# Patient Record
Sex: FEMALE | Employment: FULL TIME | URBAN - METROPOLITAN AREA
[De-identification: names, ages, dates, MRNs, and addresses within clinical notes are randomized per-mention and may not be internally consistent; named-entity substitution may affect disease eponyms.]

---

## 2022-10-03 ENCOUNTER — OFFICE VISIT (OUTPATIENT)
Dept: FAMILY MEDICINE CLINIC | Facility: CLINIC | Age: 32
End: 2022-10-03
Payer: COMMERCIAL

## 2022-10-03 VITALS
HEART RATE: 85 BPM | WEIGHT: 181.2 LBS | DIASTOLIC BLOOD PRESSURE: 60 MMHG | SYSTOLIC BLOOD PRESSURE: 102 MMHG | OXYGEN SATURATION: 91 % | TEMPERATURE: 98 F

## 2022-10-03 DIAGNOSIS — Z76.89 ENCOUNTER TO ESTABLISH CARE: ICD-10-CM

## 2022-10-03 DIAGNOSIS — Z13.0 SCREENING, ANEMIA, DEFICIENCY, IRON: ICD-10-CM

## 2022-10-03 DIAGNOSIS — N89.8 VAGINAL DISCHARGE: Primary | ICD-10-CM

## 2022-10-03 DIAGNOSIS — Z13.220 SCREENING, LIPID: ICD-10-CM

## 2022-10-03 DIAGNOSIS — Z13.1 SCREENING FOR DIABETES MELLITUS: ICD-10-CM

## 2022-10-03 DIAGNOSIS — Z13.29 SCREENING FOR THYROID DISORDER: ICD-10-CM

## 2022-10-03 DIAGNOSIS — Z11.3 SCREEN FOR STD (SEXUALLY TRANSMITTED DISEASE): ICD-10-CM

## 2022-10-03 PROCEDURE — 87510 GARDNER VAG DNA DIR PROBE: CPT | Performed by: NURSE PRACTITIONER

## 2022-10-03 PROCEDURE — 87660 TRICHOMONAS VAGIN DIR PROBE: CPT | Performed by: NURSE PRACTITIONER

## 2022-10-03 PROCEDURE — 99203 OFFICE O/P NEW LOW 30 MIN: CPT | Performed by: NURSE PRACTITIONER

## 2022-10-03 PROCEDURE — 87480 CANDIDA DNA DIR PROBE: CPT | Performed by: NURSE PRACTITIONER

## 2022-10-03 RX ORDER — VIT C/B6/B5/MAGNESIUM/HERB 173 50-5-6-5MG
CAPSULE ORAL
COMMUNITY

## 2022-10-03 RX ORDER — OMEGA-3-ACID ETHYL ESTERS 1 G/1
2 CAPSULE, LIQUID FILLED ORAL 2 TIMES DAILY
COMMUNITY

## 2022-10-03 RX ORDER — GLUCOSAMINE/D3/BOSWELLIA SERRA 1500MG-400
TABLET ORAL
COMMUNITY

## 2022-10-03 RX ORDER — METRONIDAZOLE 500 MG/1
500 TABLET ORAL EVERY 12 HOURS SCHEDULED
Qty: 14 TABLET | Refills: 0 | Status: SHIPPED | OUTPATIENT
Start: 2022-10-03 | End: 2022-10-10

## 2022-10-03 RX ORDER — FLUCONAZOLE 150 MG/1
150 TABLET ORAL ONCE
Qty: 2 TABLET | Refills: 0 | Status: SHIPPED | OUTPATIENT
Start: 2022-10-03 | End: 2022-10-03

## 2022-10-03 NOTE — ASSESSMENT & PLAN NOTE
Will treat with course Flagyl and Diflucan today based on clinical findings  Dose, use and potential side effects discussed  Strict alcohol avoidance  Swab completed and will send for culture  Discussed recent infections and possible causes of same  Advise non fragrant cleansers - designed for vaginal use  Wear breathable, cotton underwear  Avoid wearing at night when possible  Years since any labs or preventative screenings completed  Fasting lab work orders today  Complete next week - after abx course competed  F/U in office for physical exam and testing results review   F/U sooner with problems or further concerns

## 2022-10-03 NOTE — PROGRESS NOTES
[facility]    ASSESSMENT AND PLAN     1  Vaginal discharge  Assessment & Plan: Will treat with course Flagyl and Diflucan today based on clinical findings  Dose, use and potential side effects discussed  Strict alcohol avoidance  Swab completed and will send for culture  Discussed recent infections and possible causes of same  Advise non fragrant cleansers - designed for vaginal use  Wear breathable, cotton underwear  Avoid wearing at night when possible  Years since any labs or preventative screenings completed  Fasting lab work orders today  Complete next week - after abx course competed  F/U in office for physical exam and testing results review  F/U sooner with problems or further concerns    Orders:  -     VAGINOSIS DNA PROBE (AFFIRM)  -     Chlamydia/GC amplified DNA by PCR; Future  -     metroNIDAZOLE (FLAGYL) 500 mg tablet; Take 1 tablet (500 mg total) by mouth every 12 (twelve) hours for 7 days  -     fluconazole (DIFLUCAN) 150 mg tablet; Take 1 tablet (150 mg total) by mouth once for 1 dose Take second tab 3 days later    2  Screening, anemia, deficiency, iron  -     CBC and differential; Future    3  Screening for diabetes mellitus  -     Comprehensive metabolic panel; Future    4  Screening for thyroid disorder  -     TSH, 3rd generation with Free T4 reflex; Future    5  Screening, lipid  -     Lipid panel; Future    6  Screen for STD (sexually transmitted disease)  -     HIV 1/2 Antigen/Antibody (4th Generation) w Reflex [facility]; Future  -     Hepatitis C antibody; Future  -     RPR; Future  -     Herpes I/II IgG NYDIA w Reflex to HSV-2; Future  -     Chlamydia/GC amplified DNA by PCR; Future  -     Hepatitis panel, acute; Future    7  Encounter to establish care  Comments:  Establishing primary care in our office         SUBJECTIVE       Patient ID: Tanvi King is a 27 y o  female  No chief complaint on file  HISTORY OF PRESENT ILLNESS    Establishing primary care in our office  Presents with  complaint  Symptoms on/off for several months, but more intense last week  Was seen at an urgent care and diagnosed with Herpes and Mycoplasm  States she obtained her own swab in the restroom - no speculum exam was completed  Denies any lesions or ever being tested for or diagnosed with Herpes  in the past  She was told to establish with a PCP for treatment  Was last treated for BV in May  One other infection within the last year  She is unsure why she has ad these recurrent infections  She preforms proper hygiene  Patient is not currently sexually active - last active one year ago  Does admit to using fragrant body wash in the past - but has recently changed to sensitive skin wash  No other risks factors or changes in personal care or health that she is aware of  Symptoms today: Notes thick discharge, itchy and odor  She is over due for preventative care  Several years  Was diagnosed with Barron Mejia a few years ago  Started on Levothyroxine, but self discontineud  Aside from situational stress, she feels well today other than her gyn complaints above  The following portions of the patient's history were reviewed and updated as appropriate: allergies, current medications, past family history, past medical history, past social history, past surgical history, and problem list     REVIEW OF SYSTEMS  Review of Systems   Constitutional: Negative  Respiratory: Negative  Cardiovascular: Negative  Genitourinary: Positive for vaginal discharge  Negative for pelvic pain, vaginal bleeding and vaginal pain  Psychiatric/Behavioral: Negative  Nervous/anxious: situational stress - recently moved to area - living with friend - recent job change - looking for own apartment          OBJECTIVE      VITAL SIGNS  /60 (BP Location: Left arm, Patient Position: Sitting, Cuff Size: Standard)   Pulse 85   Temp 98 °F (36 7 °C) (Temporal)   Wt 82 2 kg (181 lb 3 2 oz)   SpO2 91%     CURRENT MEDICATIONS    Current Outpatient Medications:     Biotin 60719 MCG TABS, Take by mouth, Disp: , Rfl:     cholecalciferol (VITAMIN D3) 400 units tablet, Take 400 Units by mouth daily, Disp: , Rfl:     fluconazole (DIFLUCAN) 150 mg tablet, Take 1 tablet (150 mg total) by mouth once for 1 dose Take second tab 3 days later, Disp: 2 tablet, Rfl: 0    metroNIDAZOLE (FLAGYL) 500 mg tablet, Take 1 tablet (500 mg total) by mouth every 12 (twelve) hours for 7 days, Disp: 14 tablet, Rfl: 0    omega-3-acid ethyl esters (LOVAZA) 1 g capsule, Take 2 g by mouth 2 (two) times a day, Disp: , Rfl:     Prenatal MV-Min-Fe Fum-FA-DHA (PRENATAL 1 PO), Take by mouth, Disp: , Rfl:     Probiotic Product (CULTURELLE PROBIOTICS PO), Take by mouth, Disp: , Rfl:     Turmeric 500 MG CAPS, Take by mouth, Disp: , Rfl:       PHYSICAL EXAMINATION   Physical Exam  Vitals and nursing note reviewed  Constitutional:       General: She is not in acute distress  Appearance: Normal appearance  She is not ill-appearing  HENT:      Head: Normocephalic  Pulmonary:      Effort: Pulmonary effort is normal  No respiratory distress  Genitourinary:     Exam position: Lithotomy position  Pubic Area: No rash  Labia:         Right: No tenderness or lesion  Left: No tenderness or lesion  Vagina: No signs of injury  Vaginal discharge and erythema present  No tenderness or bleeding  Comments: clitoral piercing  Skin:     General: Skin is warm and dry  Neurological:      General: No focal deficit present  Mental Status: She is alert and oriented to person, place, and time     Psychiatric:         Attention and Perception: Attention normal          Mood and Affect: Mood normal          Behavior: Behavior normal

## 2022-10-10 ENCOUNTER — TELEPHONE (OUTPATIENT)
Dept: FAMILY MEDICINE CLINIC | Facility: CLINIC | Age: 32
End: 2022-10-10

## 2022-10-10 NOTE — TELEPHONE ENCOUNTER
Romeo Kessler from [address] [place] [address] said that [facility] [employer] called stating there was an issue with the vaginosis swab done 10/3 Affirm

## 2022-10-26 ENCOUNTER — APPOINTMENT (OUTPATIENT)
Dept: LAB | Facility: CLINIC | Age: 32
End: 2022-10-26

## 2022-10-26 ENCOUNTER — OFFICE VISIT (OUTPATIENT)
Dept: FAMILY MEDICINE CLINIC | Facility: CLINIC | Age: 32
End: 2022-10-26

## 2022-10-26 VITALS
SYSTOLIC BLOOD PRESSURE: 110 MMHG | OXYGEN SATURATION: 100 % | BODY MASS INDEX: 31.89 KG/M2 | DIASTOLIC BLOOD PRESSURE: 70 MMHG | HEIGHT: 63 IN | TEMPERATURE: 98.5 F | HEART RATE: 64 BPM | WEIGHT: 180 LBS

## 2022-10-26 DIAGNOSIS — N76.0 BACTERIAL VAGINOSIS: ICD-10-CM

## 2022-10-26 DIAGNOSIS — Z11.3 SCREEN FOR STD (SEXUALLY TRANSMITTED DISEASE): ICD-10-CM

## 2022-10-26 DIAGNOSIS — Z13.1 SCREENING FOR DIABETES MELLITUS: ICD-10-CM

## 2022-10-26 DIAGNOSIS — Z76.89 ENCOUNTER TO ESTABLISH CARE: ICD-10-CM

## 2022-10-26 DIAGNOSIS — Z13.220 SCREENING, LIPID: ICD-10-CM

## 2022-10-26 DIAGNOSIS — Z12.4 SCREENING FOR CERVICAL CANCER: ICD-10-CM

## 2022-10-26 DIAGNOSIS — B96.89 BACTERIAL VAGINOSIS: ICD-10-CM

## 2022-10-26 DIAGNOSIS — Z13.0 SCREENING, ANEMIA, DEFICIENCY, IRON: ICD-10-CM

## 2022-10-26 DIAGNOSIS — N89.8 VAGINAL DISCHARGE: Primary | ICD-10-CM

## 2022-10-26 DIAGNOSIS — N89.8 VAGINAL ODOR: ICD-10-CM

## 2022-10-26 DIAGNOSIS — N89.8 VAGINAL DISCHARGE: ICD-10-CM

## 2022-10-26 DIAGNOSIS — Z13.29 SCREENING FOR THYROID DISORDER: ICD-10-CM

## 2022-10-26 LAB
ALBUMIN SERPL BCP-MCNC: 3.4 G/DL (ref 3.5–5)
ALP SERPL-CCNC: 55 U/L (ref 46–116)
ALT SERPL W P-5'-P-CCNC: 40 U/L (ref 12–78)
ANION GAP SERPL CALCULATED.3IONS-SCNC: 4 MMOL/L (ref 4–13)
AST SERPL W P-5'-P-CCNC: 29 U/L (ref 5–45)
BASOPHILS # BLD AUTO: 0.03 THOUSANDS/ÂΜL (ref 0–0.1)
BASOPHILS NFR BLD AUTO: 1 % (ref 0–1)
BILIRUB SERPL-MCNC: 0.27 MG/DL (ref 0.2–1)
BUN SERPL-MCNC: 5 MG/DL (ref 5–25)
CALCIUM ALBUM COR SERPL-MCNC: 9.8 MG/DL (ref 8.3–10.1)
CALCIUM SERPL-MCNC: 9.3 MG/DL (ref 8.3–10.1)
CHLORIDE SERPL-SCNC: 110 MMOL/L (ref 96–108)
CHOLEST SERPL-MCNC: 147 MG/DL
CO2 SERPL-SCNC: 26 MMOL/L (ref 21–32)
CREAT SERPL-MCNC: 0.76 MG/DL (ref 0.6–1.3)
EOSINOPHIL # BLD AUTO: 0.13 THOUSAND/ÂΜL (ref 0–0.61)
EOSINOPHIL NFR BLD AUTO: 3 % (ref 0–6)
ERYTHROCYTE [DISTWIDTH] IN BLOOD BY AUTOMATED COUNT: 12 % (ref 11.6–15.1)
GFR SERPL CREATININE-BSD FRML MDRD: 102 ML/MIN/1.73SQ M
GLUCOSE P FAST SERPL-MCNC: 88 MG/DL (ref 65–99)
HAV IGM SER QL: NORMAL
HBV CORE IGM SER QL: NORMAL
HBV SURFACE AG SER QL: NORMAL
HCT VFR BLD AUTO: 43.2 % (ref 34.8–46.1)
HCV AB SER QL: NORMAL
HDLC SERPL-MCNC: 50 MG/DL
HGB BLD-MCNC: 14.4 G/DL (ref 11.5–15.4)
IMM GRANULOCYTES # BLD AUTO: 0 THOUSAND/UL (ref 0–0.2)
IMM GRANULOCYTES NFR BLD AUTO: 0 % (ref 0–2)
LDLC SERPL CALC-MCNC: 81 MG/DL (ref 0–100)
LYMPHOCYTES # BLD AUTO: 2.1 THOUSANDS/ÂΜL (ref 0.6–4.47)
LYMPHOCYTES NFR BLD AUTO: 53 % (ref 14–44)
MCH RBC QN AUTO: 31 PG (ref 26.8–34.3)
MCHC RBC AUTO-ENTMCNC: 33.3 G/DL (ref 31.4–37.4)
MCV RBC AUTO: 93 FL (ref 82–98)
MONOCYTES # BLD AUTO: 0.38 THOUSAND/ÂΜL (ref 0.17–1.22)
MONOCYTES NFR BLD AUTO: 10 % (ref 4–12)
NEUTROPHILS # BLD AUTO: 1.31 THOUSANDS/ÂΜL (ref 1.85–7.62)
NEUTS SEG NFR BLD AUTO: 33 % (ref 43–75)
NONHDLC SERPL-MCNC: 97 MG/DL
NRBC BLD AUTO-RTO: 0 /100 WBCS
PLATELET # BLD AUTO: 324 THOUSANDS/UL (ref 149–390)
PMV BLD AUTO: 9.3 FL (ref 8.9–12.7)
POTASSIUM SERPL-SCNC: 3.9 MMOL/L (ref 3.5–5.3)
PROT SERPL-MCNC: 7.7 G/DL (ref 6.4–8.4)
RBC # BLD AUTO: 4.65 MILLION/UL (ref 3.81–5.12)
SODIUM SERPL-SCNC: 140 MMOL/L (ref 135–147)
TRIGL SERPL-MCNC: 80 MG/DL
TSH SERPL DL<=0.05 MIU/L-ACNC: 1.58 UIU/ML (ref 0.45–4.5)
WBC # BLD AUTO: 3.95 THOUSAND/UL (ref 4.31–10.16)

## 2022-10-26 RX ORDER — ZINC GLUCONATE 50 MG
50 TABLET ORAL DAILY
COMMUNITY

## 2022-10-26 NOTE — PROGRESS NOTES
[facility]    ASSESSMENT AND PLAN     1  Vaginal discharge  Assessment & Plan:  Mild vaginal discharge noted again during today's exam   No foul odor detected  Will collect Afirma swab as well as a Pap today (patient is very overdue)  Pt with recurrent symptoms over the last few months - treated several times at walk in clinics and then here  Would recommend establishing with gyn at this point for further evaluation and treatment recommendations  May likely need long-term treatment  Will call once swabs in fasting lab work are resulted  Addendum: Spoke with pt regarding results today (11/3)  Vaginal symptoms resolved several days ago but have restarted again yesterday  Advised she be evaluated by Gyn  Pt agreeable and referral placed  We did also discuss her STD testing results  She would like to recheck again in 6 months  Orders:  -     VAGINOSIS DNA PROBE (AFFIRM)  -     Ambulatory Referral to Gynecology; Future    2  Vaginal odor  -     VAGINOSIS DNA PROBE (AFFIRM)  -     Ambulatory Referral to Gynecology; Future    3  Screening for cervical cancer  -     Liquid-based pap, screening  -     HPV High Risk    4  Encounter to establish care  -     Ambulatory Referral to Gynecology; Future    5  Bacterial vaginosis  -     Ambulatory Referral to Gynecology; Future         SUBJECTIVE       Patient ID: Marianela Barrientos is a 27 y o  female  Chief Complaint   Patient presents with   • Vaginal Discharge     As of 2 days ago - odor is back- discharge is not as bad       HISTORY OF PRESENT ILLNESS    Presents complaint of recurrent vaginal discharge and genital order  On and off for the last several months  Cautious with her feminine hygiene products  Uses natural, fragrance- free soaps/cleansers  Cotton panties or none while sleeping  Does not douche  Has not been sexually active  Discharge is thick and yellow    Mild external itching    Female  Problem  The patient's primary symptoms include a genital odor and vaginal discharge  The patient's pertinent negatives include no genital itching, genital lesions, genital rash, missed menses, pelvic pain or vaginal bleeding  This is a recurrent problem  The current episode started yesterday  The problem occurs intermittently  The patient is experiencing no pain  She is not pregnant  Pertinent negatives include no abdominal pain, anorexia, back pain, chills, constipation, diarrhea, discolored urine, dysuria, fever, flank pain, frequency, headaches, hematuria, joint pain, joint swelling, nausea, painful intercourse, rash, sore throat, urgency or vomiting  The vaginal discharge was grey, malodorous, milky, thin, watery and white  She has not been passing clots  She has not been passing tissue  She is not sexually active  It is unknown whether or not her partner has an STD  She uses nothing for contraception  Her menstrual history has been regular  The following portions of the patient's history were reviewed and updated as appropriate: allergies, current medications, past family history, past medical history, past social history, past surgical history, and problem list     REVIEW OF SYSTEMS  Review of Systems   Constitutional: Negative for chills and fever  HENT: Negative for sore throat  Gastrointestinal: Negative for abdominal pain, anorexia, constipation, diarrhea, nausea and vomiting  Genitourinary: Positive for vaginal discharge  Negative for dysuria, flank pain, frequency, hematuria, missed menses, pelvic pain, urgency, vaginal bleeding and vaginal pain  Musculoskeletal: Negative for back pain and joint pain  Skin: Negative for rash  Neurological: Negative for headaches         OBJECTIVE      VITAL SIGNS  /70 (BP Location: Right arm, Patient Position: Sitting, Cuff Size: Standard)   Pulse 64   Temp 98 5 °F (36 9 °C) (Temporal)   Ht 5' 3" (1 6 m)   Wt 81 6 kg (180 lb)   SpO2 100%   BMI 31 89 kg/m²     CURRENT MEDICATIONS    Current Outpatient Medications:   •  Biotin 51116 MCG TABS, Take by mouth, Disp: , Rfl:   •  Cholecalciferol (VITAMIN D3 PO), Take 1,000 Units by mouth daily, Disp: , Rfl:   •  Cyanocobalamin (B-12 PO), Take 5,000 mcg by mouth, Disp: , Rfl:   •  Omega-3 Fatty Acids (OMEGA-3 FISH OIL PO), Take 2,000 mg by mouth in the morning, Disp: , Rfl:   •  Prenatal MV-Min-Fe Fum-FA-DHA (PRENATAL 1 PO), Take by mouth, Disp: , Rfl:   •  Probiotic Product (CULTURELLE PROBIOTICS PO), Take by mouth, Disp: , Rfl:   •  Turmeric 500 MG CAPS, Take by mouth, Disp: , Rfl:   •  zinc gluconate 50 mg tablet, Take 50 mg by mouth daily, Disp: , Rfl:   •  omega-3-acid ethyl esters (LOVAZA) 1 g capsule, Take 2 g by mouth 2 (two) times a day (Patient not taking: Reported on 10/26/2022), Disp: , Rfl:       PHYSICAL EXAMINATION   Physical Exam  Vitals and nursing note reviewed  Constitutional:       General: She is not in acute distress  Appearance: Normal appearance  She is not ill-appearing  HENT:      Head: Normocephalic  Pulmonary:      Effort: Pulmonary effort is normal  No respiratory distress  Genitourinary:     Exam position: Lithotomy position  Pubic Area: No rash  Labia:         Right: No rash, tenderness or lesion  Left: No rash, tenderness or lesion  Vagina: Vaginal discharge present  No erythema, tenderness or lesions  Cervix: No cervical motion tenderness  Neurological:      General: No focal deficit present  Mental Status: She is alert and oriented to person, place, and time     Psychiatric:         Attention and Perception: Attention normal          Mood and Affect: Mood normal          Behavior: Behavior normal

## 2022-10-27 LAB
C TRACH DNA SPEC QL NAA+PROBE: NEGATIVE
HIV 1+2 AB+HIV1 P24 AG SERPL QL IA: NORMAL
HPV HR 12 DNA CVX QL NAA+PROBE: NEGATIVE
HPV16 DNA CVX QL NAA+PROBE: NEGATIVE
HPV18 DNA CVX QL NAA+PROBE: NEGATIVE
HSV1 IGG SER IA-ACNC: 23 INDEX (ref 0–0.9)
HSV2 IGG SER IA-ACNC: 11.7 INDEX (ref 0–0.9)
N GONORRHOEA DNA SPEC QL NAA+PROBE: NEGATIVE
RPR SER QL: NORMAL

## 2022-10-28 ENCOUNTER — TELEPHONE (OUTPATIENT)
Dept: FAMILY MEDICINE CLINIC | Facility: CLINIC | Age: 32
End: 2022-10-28

## 2022-10-28 LAB
CANDIDA RRNA VAG QL PROBE: NEGATIVE
G VAGINALIS RRNA GENITAL QL PROBE: POSITIVE
T VAGINALIS RRNA GENITAL QL PROBE: NEGATIVE

## 2022-11-02 ENCOUNTER — TELEPHONE (OUTPATIENT)
Dept: FAMILY MEDICINE CLINIC | Facility: CLINIC | Age: 32
End: 2022-11-02

## 2022-11-03 LAB
LAB AP GYN PRIMARY INTERPRETATION: NORMAL
Lab: NORMAL
PATH INTERP SPEC-IMP: NORMAL

## 2022-11-03 NOTE — TELEPHONE ENCOUNTER
Called pt and told her that ROSALINE Kulkarni wanted her to make an appt with OB-Gyn asap, as per [facility]

## 2022-11-03 NOTE — ASSESSMENT & PLAN NOTE
Mild vaginal discharge noted again during today's exam   No foul odor detected  Will collect Afirma swab as well as a Pap today (patient is very overdue)  Pt with recurrent symptoms over the last few months - treated several times at walk in clinics and then here  Would recommend establishing with gyn at this point for further evaluation and treatment recommendations  May likely need long-term treatment  Will call once swabs in fasting lab work are resulted  Addendum: Spoke with pt regarding results today (11/3)  Vaginal symptoms resolved several days ago but have restarted again yesterday  Advised she be evaluated by Gyn  Pt agreeable and referral placed  We did also discuss her STD testing results  She would like to recheck again in 6 months

## 2022-11-03 NOTE — TELEPHONE ENCOUNTER
Pt called and stated she would like to go over the labs that did come back      Please advise [phone]

## 2023-01-13 ENCOUNTER — OFFICE VISIT (OUTPATIENT)
Dept: OBGYN CLINIC | Facility: CLINIC | Age: 33
End: 2023-01-13

## 2023-01-13 VITALS
WEIGHT: 185 LBS | DIASTOLIC BLOOD PRESSURE: 82 MMHG | SYSTOLIC BLOOD PRESSURE: 140 MMHG | BODY MASS INDEX: 32.77 KG/M2 | HEART RATE: 76 BPM

## 2023-01-13 DIAGNOSIS — N92.6 IRREGULAR MENSES: ICD-10-CM

## 2023-01-13 DIAGNOSIS — N89.8 VAGINAL DISCHARGE: Primary | ICD-10-CM

## 2023-01-13 DIAGNOSIS — N76.0 RECURRENT VAGINITIS: ICD-10-CM

## 2023-01-13 DIAGNOSIS — B96.89 BV (BACTERIAL VAGINOSIS): ICD-10-CM

## 2023-01-13 DIAGNOSIS — N89.8 VAGINAL ODOR: ICD-10-CM

## 2023-01-13 DIAGNOSIS — N76.0 BV (BACTERIAL VAGINOSIS): ICD-10-CM

## 2023-01-13 LAB
BV WHIFF TEST VAG QL: ABNORMAL
CLUE CELLS SPEC QL WET PREP: ABNORMAL
PH SMN: 5 [PH]
SL AMB POCT URINE HCG: NORMAL
SL AMB POCT WET MOUNT: ABNORMAL
T VAGINALIS VAG QL WET PREP: ABNORMAL
YEAST VAG QL WET PREP: ABNORMAL

## 2023-01-13 RX ORDER — FLUCONAZOLE 150 MG/1
150 TABLET ORAL ONCE
Qty: 1 TABLET | Refills: 0 | Status: SHIPPED | OUTPATIENT
Start: 2023-01-13 | End: 2023-01-13

## 2023-01-13 RX ORDER — METRONIDAZOLE 7.5 MG/G
1 GEL VAGINAL
Qty: 70 G | Refills: 3 | Status: SHIPPED | OUTPATIENT
Start: 2023-01-13

## 2023-01-13 RX ORDER — METRONIDAZOLE 500 MG/1
500 TABLET ORAL EVERY 12 HOURS SCHEDULED
Qty: 14 TABLET | Refills: 0 | Status: SHIPPED | OUTPATIENT
Start: 2023-01-13 | End: 2023-01-20

## 2023-01-13 NOTE — PROGRESS NOTES
PROBLEM GYNECOLOGICAL VISIT    Angela Duke is a 27 y o  female who presents today with complaint of vaginal discharge and odor  Her general medical history has been reviewed and she reports it as follows:    Past Medical History:   Diagnosis Date   • Chlamydia    • Depression    • Gonorrhea    • Hashimoto's disease 2018    has not been treated since early , TSH normal 10/2022     Past Surgical History:   Procedure Laterality Date   • NO PAST SURGERIES       OB History        2    Para   1    Term   1       0    AB   1    Living   1       SAB   0    IAB   1    Ectopic   0    Multiple   0    Live Births   1               Social History     Tobacco Use   • Smoking status: Never   • Smokeless tobacco: Never   Vaping Use   • Vaping Use: Never used   Substance Use Topics   • Alcohol use: Yes     Comment: 1-2 drinks daily, more on weekends   • Drug use: Never     Social History     Substance and Sexual Activity   Sexual Activity Yes   • Partners: Male   • Birth control/protection: Emergency Contraception       Current Outpatient Medications   Medication Instructions   • Biotin 16239 MCG TABS Oral   • Cyanocobalamin (B-12 PO) 5,000 mcg, Oral   • Omega-3 Fatty Acids (OMEGA-3 FISH OIL PO) 2,000 mg, Oral, Daily   • Prenatal MV-Min-Fe Fum-FA-DHA (PRENATAL 1 PO) Oral   • Probiotic Product (CULTURELLE PROBIOTICS PO) Oral   • Turmeric 500 MG CAPS Oral   • zinc gluconate 50 mg, Oral, Daily       History of Present Illness:   Reports vaginal discharge and odor for the past 2 weeks  She has a history of requiring treatment for BV multiple times over the past year  She was last treated with Flagyl in 2022  Denies vaginal itching/irritation  Denies pelvic pain  She had TAB in 2022 and has not had menses since then, although has used Plan B x2 episodes  Review of Systems:  Review of Systems   Constitutional: Negative  Gastrointestinal: Negative      Genitourinary: Positive for vaginal discharge  Physical Exam:  /82   Pulse 76   Wt 83 9 kg (185 lb)   LMP 10/20/2022   BMI 32 77 kg/m²   Physical Exam  Constitutional:       General: She is not in acute distress  Genitourinary:      Vulva exam comments: Normal       Vaginal discharge present  No vaginal erythema  Neurological:      Mental Status: She is alert  Skin:     General: Skin is warm and dry  Vitals reviewed  Point of Care Testing:   -Wet mount: + clue cells, no trichomonads, few WBC's, pH=5 0   -KOH mount: no hyphae   -Whiff: positive   -urine pregnancy test: negative    Assessment:   1  Recurrent BV  Plan:   1  Cultures ordered: GC/CT  2  Given Rx PO Flagyl and then for Metrogel twice weekly x3 months  3  Given Rx Diflucan for prevention of candidiasis  4  Return to office in 4 months for re-evaluation  5  Patient's depression screening was assessed with a PHQ-2 score of 2  Their PHQ-9 score was 7  Patient assessed for underlying major depression  They have no active suicidal ideations  Brief counseling provided and recommend additional follow-up/re-evaluation next office visit  Reviewed with patient that test results are available in [facility] immediately, but that they will not necessarily be reviewed by me immediately  Explained that I will review results at my earliest opportunity and contact patient appropriately

## 2023-01-13 NOTE — LETTER
2023    To Angela Duke  : 1990      This letter is to advise you that your recent CULTURES for gonorrhea and chlamydia were reviewed by me and are NORMAL  Please contact the office for an appointment if you have any additional concerns      [id number] [facility] Lisbet Elias

## 2023-01-13 NOTE — PATIENT INSTRUCTIONS
Thank you for your confidence in our team    We appreciate you and welcome your feedback  If you receive a survey from us, please take a few moments to let us know how we are doing     Sincerely,  Carly Rosario

## 2023-01-14 LAB
C TRACH DNA SPEC QL NAA+PROBE: NEGATIVE
N GONORRHOEA DNA SPEC QL NAA+PROBE: NEGATIVE

## 2023-05-12 ENCOUNTER — OFFICE VISIT (OUTPATIENT)
Dept: OBGYN CLINIC | Facility: CLINIC | Age: 33
End: 2023-05-12

## 2023-05-12 VITALS
BODY MASS INDEX: 33.35 KG/M2 | SYSTOLIC BLOOD PRESSURE: 146 MMHG | WEIGHT: 188.2 LBS | DIASTOLIC BLOOD PRESSURE: 81 MMHG | HEIGHT: 63 IN | HEART RATE: 81 BPM

## 2023-05-12 DIAGNOSIS — B96.89 BV (BACTERIAL VAGINOSIS): ICD-10-CM

## 2023-05-12 DIAGNOSIS — N76.0 BV (BACTERIAL VAGINOSIS): ICD-10-CM

## 2023-05-12 DIAGNOSIS — N76.0 RECURRENT VAGINITIS: ICD-10-CM

## 2023-05-12 DIAGNOSIS — N89.8 VAGINAL DISCHARGE: Primary | ICD-10-CM

## 2023-05-12 DIAGNOSIS — N89.8 VAGINAL ODOR: ICD-10-CM

## 2023-05-12 LAB
BV WHIFF TEST VAG QL: ABNORMAL
CLUE CELLS SPEC QL WET PREP: ABNORMAL
PH SMN: 5 [PH]
SL AMB POCT WET MOUNT: ABNORMAL
T VAGINALIS VAG QL WET PREP: ABNORMAL
YEAST VAG QL WET PREP: ABNORMAL

## 2023-05-12 RX ORDER — METRONIDAZOLE 7.5 MG/G
1 GEL VAGINAL 2 TIMES WEEKLY
Qty: 70 G | Refills: 3 | Status: SHIPPED | OUTPATIENT
Start: 2023-05-15

## 2023-05-12 RX ORDER — METRONIDAZOLE 500 MG/1
500 TABLET ORAL EVERY 12 HOURS SCHEDULED
Qty: 14 TABLET | Refills: 0 | Status: SHIPPED | OUTPATIENT
Start: 2023-05-12 | End: 2023-05-19

## 2023-05-12 NOTE — PATIENT INSTRUCTIONS
Thank you for your confidence in our team    We appreciate you and welcome your feedback  If you receive a survey from us, please take a few moments to let us know how we are doing     Sincerely,  Kilo Clemens

## 2023-05-12 NOTE — PROGRESS NOTES
"Julianna Parra presents today for follow up of recurrent BV  She was seen by me last in 1/2023 and advised to take 7-day course of PO Flagyl and then start twice weekly vaginal Metrogel  She reports that she completed the PO Flagyl, but has not been consistent with vaginal Metrogel  Now reports symptoms of vaginal discharge and odor again  /81   Pulse 81   Ht 5' 3\" (1 6 m)   Wt 85 4 kg (188 lb 3 2 oz)   LMP 05/06/2023   BMI 33 34 kg/m²   On wet mount she is noted to have + clue cell and + whiff  No hyphae  Given Rx PO Flagyl course again  Advised then start vaginal Metrogel twice weekly  Return in 3 months to re-evaluate  Annual GYN exam to be scheduled for 11/2023    "

## 2023-08-17 ENCOUNTER — OFFICE VISIT (OUTPATIENT)
Dept: OBGYN CLINIC | Facility: CLINIC | Age: 33
End: 2023-08-17

## 2023-08-17 VITALS
HEART RATE: 91 BPM | DIASTOLIC BLOOD PRESSURE: 84 MMHG | WEIGHT: 185.4 LBS | SYSTOLIC BLOOD PRESSURE: 129 MMHG | HEIGHT: 63 IN | BODY MASS INDEX: 32.85 KG/M2

## 2023-08-17 DIAGNOSIS — B96.89 BV (BACTERIAL VAGINOSIS): ICD-10-CM

## 2023-08-17 DIAGNOSIS — B37.31 VAGINAL CANDIDIASIS: Primary | ICD-10-CM

## 2023-08-17 DIAGNOSIS — N76.0 BV (BACTERIAL VAGINOSIS): ICD-10-CM

## 2023-08-17 DIAGNOSIS — N76.0 RECURRENT VAGINITIS: ICD-10-CM

## 2023-08-17 PROCEDURE — 99212 OFFICE O/P EST SF 10 MIN: CPT | Performed by: NURSE PRACTITIONER

## 2023-08-17 RX ORDER — FLUCONAZOLE 150 MG/1
150 TABLET ORAL ONCE
Qty: 1 TABLET | Refills: 0 | Status: SHIPPED | OUTPATIENT
Start: 2023-08-17 | End: 2023-08-17

## 2023-08-17 RX ORDER — METRONIDAZOLE 500 MG/1
500 TABLET ORAL 2 TIMES DAILY
Qty: 14 TABLET | Refills: 0 | Status: SHIPPED | OUTPATIENT
Start: 2023-08-17 | End: 2023-08-24

## 2023-08-17 RX ORDER — METRONIDAZOLE 7.5 MG/G
1 GEL VAGINAL 2 TIMES WEEKLY
Qty: 70 G | Refills: 3 | Status: SHIPPED | OUTPATIENT
Start: 2023-08-17

## 2023-08-17 NOTE — PATIENT INSTRUCTIONS
Thank you for your confidence in our team.   We appreciate you and welcome your feedback. If you receive a survey from us, please take a few moments to let us know how we are doing.    Sincerely,  Sid Jesus

## 2023-08-17 NOTE — PROGRESS NOTES
Cristobal Villagomez presents today for follow up of recurrent BV. She was supposed to take PO Flagyl x7 days in 5/2023 when I last saw her and then resume vaginal Metrogel twice weekly. She reports that she never took PO Flagyl, just used vaginal Metrogel twice weekly and felt like the BV never resolved, so about 3 weeks ago started Metrogel daily x10 days and symptoms of vaginal discharge and odor resolved, so she stopped using it altogether and then menses started and is currently present. She is feeling very frustrated at the idea that she may need to continue with vaginal Metrogel for a prolonged period of time. She is questioning whether use of boric acid could help. I advised patient that she could certainly try boric acid, but studies are not supportive of its use being superior to Flagyl. Offered her resumption of Flagyl as before and she accepts. Return in 3 months for follow up.

## 2023-12-28 ENCOUNTER — TELEPHONE (OUTPATIENT)
Dept: FAMILY MEDICINE CLINIC | Facility: CLINIC | Age: 33
End: 2023-12-28

## 2023-12-29 NOTE — TELEPHONE ENCOUNTER
12/29/23 9:00 AM     The office's request has been received.     Office staff to respond via Encounter and send to appropriate care gap pool - provide reason for pcp removal - patient has moved and no longer following with pcp/ office OR patient is now following with an external (non [facility]) PCP?    Thank you  Josefa Cortez

## 2024-01-11 NOTE — TELEPHONE ENCOUNTER
Patient is receiving care with another provider and will no longer be seen here for her care and treatment .

## 2024-01-19 NOTE — TELEPHONE ENCOUNTER
01/18/24 11:04 PM        The office's request has been received, reviewed, and the patient chart updated. The PCP has successfully been removed with a patient attribution note. This message will now be completed.        Thank you  Annabella Moreland

## 2024-06-07 ENCOUNTER — TELEPHONE (OUTPATIENT)
Dept: OTHER | Facility: OTHER | Age: 34
End: 2024-06-07

## 2024-06-07 ENCOUNTER — NURSE TRIAGE (OUTPATIENT)
Dept: OTHER | Facility: OTHER | Age: 34
End: 2024-06-07

## 2024-06-07 NOTE — TELEPHONE ENCOUNTER
"Reason for Disposition  • Bad smelling vaginal discharge    Answer Assessment - Initial Assessment Questions  1. DISCHARGE: \"Describe the discharge.\" (e.g., white, yellow, green, gray, foamy, cottage cheese-like)      Foggy-White    2. ODOR: \"Is there a bad odor?\"      Having some odor     3. ONSET: \"When did the discharge begin?\"      3 months     4. RASH: \"Is there a rash in that area?\" If Yes, ask: \"Describe it.\" (e.g., redness, blisters, sores, bumps)      Denies    5. ABDOMINAL PAIN: \"Are you having any abdominal pain?\" If Yes, ask: \"What does it feel like? \" (e.g., crampy, dull, intermittent, constant)       Denies     6. ABDOMINAL PAIN SEVERITY: If present, ask: \"How bad is it?\"  (e.g., mild, moderate, severe)   - MILD - doesn't interfere with normal activities    - MODERATE - interferes with normal activities or awakens from sleep    - SEVERE - patient doesn't want to move (R/O peritonitis)       Denies    7. CAUSE: \"What do you think is causing the discharge?\" \"Have you had the same problem before? What happened then?\"      BV    8. OTHER SYMPTOMS: \"Do you have any other symptoms?\" (e.g., fever, itching, vaginal bleeding, pain with urination, injury to genital area, vaginal foreign body)      Denies itching     9. PREGNANCY: \"Is there any chance you are pregnant?\" \"When was your last menstrual period?\"      Denies     Feel like the gel has not been working. Wondering if she should antibiotic. She is comfortable waiting to see  for Monday. Denies wanting on call provider paged.    Protocols used: Vaginal Discharge-ADULT-AH    "

## 2024-06-07 NOTE — TELEPHONE ENCOUNTER
Offered to page on call provider but patient declined. Encouraged patient to call back with worsening symptoms or questions, verbalized understanding.      Please call patient to follow up and make an appointment. Unable to do so at this time.

## 2024-06-11 ENCOUNTER — OFFICE VISIT (OUTPATIENT)
Dept: OBGYN CLINIC | Facility: CLINIC | Age: 34
End: 2024-06-11

## 2024-06-11 VITALS
DIASTOLIC BLOOD PRESSURE: 86 MMHG | WEIGHT: 186.8 LBS | SYSTOLIC BLOOD PRESSURE: 131 MMHG | BODY MASS INDEX: 33.09 KG/M2 | HEART RATE: 80 BPM

## 2024-06-11 DIAGNOSIS — N89.8 VAGINAL ODOR: ICD-10-CM

## 2024-06-11 DIAGNOSIS — B96.89 BV (BACTERIAL VAGINOSIS): ICD-10-CM

## 2024-06-11 DIAGNOSIS — B37.31 VAGINAL CANDIDIASIS: ICD-10-CM

## 2024-06-11 DIAGNOSIS — N89.8 VAGINAL DISCHARGE: Primary | ICD-10-CM

## 2024-06-11 DIAGNOSIS — N76.0 BV (BACTERIAL VAGINOSIS): ICD-10-CM

## 2024-06-11 LAB
BV WHIFF TEST VAG QL: ABNORMAL
CLUE CELLS SPEC QL WET PREP: ABNORMAL
PH SMN: 4.5 [PH]
SL AMB POCT WET MOUNT: ABNORMAL
T VAGINALIS VAG QL WET PREP: ABNORMAL
YEAST VAG QL WET PREP: ABNORMAL

## 2024-06-11 PROCEDURE — 99213 OFFICE O/P EST LOW 20 MIN: CPT | Performed by: NURSE PRACTITIONER

## 2024-06-11 PROCEDURE — 87210 SMEAR WET MOUNT SALINE/INK: CPT | Performed by: NURSE PRACTITIONER

## 2024-06-11 RX ORDER — FLUCONAZOLE 150 MG/1
150 TABLET ORAL ONCE
Qty: 1 TABLET | Refills: 0 | Status: SHIPPED | OUTPATIENT
Start: 2024-06-11 | End: 2024-06-11

## 2024-06-11 RX ORDER — METRONIDAZOLE 500 MG/1
500 TABLET ORAL 2 TIMES DAILY
Qty: 14 TABLET | Refills: 0 | Status: SHIPPED | OUTPATIENT
Start: 2024-06-11 | End: 2024-06-18

## 2024-06-11 NOTE — PATIENT INSTRUCTIONS
Thank you for your confidence in our team.   We appreciate you and welcome your feedback.   If you receive a survey from us, please take a few moments to let us know how we are doing.   Sincerely,  DIANA Gurrola

## 2024-06-11 NOTE — PROGRESS NOTES
PROBLEM GYNECOLOGICAL VISIT    Uzma Hernandez is a 33 y.o. female who presents today with complaint of vaginal discharge and odor.  Her general medical history has been reviewed and she reports it as follows:    Past Medical History:   Diagnosis Date    Chlamydia     Depression     Gonorrhea     Hashimoto's disease 2018    has not been treated since early , TSH normal 10/2022     Past Surgical History:   Procedure Laterality Date    NO PAST SURGERIES       OB History          2    Para   1    Term   1       0    AB   1    Living   1         SAB   0    IAB   1    Ectopic   0    Multiple   0    Live Births   1               Social History     Tobacco Use    Smoking status: Never    Smokeless tobacco: Never   Vaping Use    Vaping status: Never Used   Substance Use Topics    Alcohol use: Yes     Comment: 1-2 drinks daily, more on weekends    Drug use: Never     Social History     Substance and Sexual Activity   Sexual Activity Yes    Partners: Male    Birth control/protection: None       Current Outpatient Medications   Medication Instructions    Biotin 39684 MCG TABS Oral    Cyanocobalamin (B-12 PO) 5,000 mcg, Oral    Omega-3 Fatty Acids (OMEGA-3 FISH OIL PO) 2,000 mg, Oral, Daily    Prenatal MV-Min-Fe Fum-FA-DHA (PRENATAL 1 PO) Oral    Probiotic Product (CULTURELLE PROBIOTICS PO) Oral    Turmeric 500 MG CAPS Oral    zinc gluconate 50 mg, Oral, Daily       History of Present Illness:   Reports for the past 1-2 weeks she has been experiencing vaginal discharge and odor.  Denies pelvic pain and dysuria.  Denies vaginal itching.    Review of Systems:  Review of Systems   Constitutional: Negative.    Gastrointestinal: Negative.    Genitourinary:  Positive for vaginal discharge. Negative for dysuria, menstrual problem, pelvic pain and vaginal pain. Vaginal bleeding: and odor.      Physical Exam:  /86 (BP Location: Right arm, Patient Position: Sitting, Cuff Size: Large)   Pulse 80   Wt 84.7  kg (186 lb 12.8 oz)   LMP 06/01/2024   BMI 33.09 kg/m²   Physical Exam  Constitutional:       General: She is not in acute distress.  Genitourinary:      Right Labia: No rash, tenderness, lesions or skin changes.     Left Labia: No tenderness, lesions, skin changes or rash.     Vaginal discharge and erythema present.   Abdominal:      Palpations: Abdomen is soft.      Tenderness: There is no abdominal tenderness.   Neurological:      Mental Status: She is alert.   Skin:     General: Skin is warm and dry.   Vitals reviewed.       Point of Care Testing:   -Wet mount: + clue cells, no trichomonads, few WBC's, pH=4.5   -KOH mount: + hyphae   -Whiff: positive    Assessment:   1. BV.   2. Vaginal candidiasis.    Plan:   1. Cultures ordered: GC/CT.   2. Given Rx Flagyl and Diflucan.   3. Return to office in 2 weeks for annual GYN exam.   4. Patient's depression screening was assessed with a PHQ-2 score of 3. Their PHQ-9 score was 8. Clinically patient does not have depression. No treatment is required.    Reviewed with patient that test results are available in [facility] immediately, but that they will not necessarily be reviewed by me immediately.  Explained that I will review results at my earliest opportunity and contact patient appropriately.

## 2024-06-13 ENCOUNTER — TELEPHONE (OUTPATIENT)
Dept: OBGYN CLINIC | Facility: CLINIC | Age: 34
End: 2024-06-13

## 2024-06-13 ENCOUNTER — OFFICE VISIT (OUTPATIENT)
Dept: OBGYN CLINIC | Facility: CLINIC | Age: 34
End: 2024-06-13

## 2024-06-13 VITALS
DIASTOLIC BLOOD PRESSURE: 86 MMHG | WEIGHT: 192 LBS | SYSTOLIC BLOOD PRESSURE: 136 MMHG | HEART RATE: 87 BPM | BODY MASS INDEX: 34.01 KG/M2

## 2024-06-13 DIAGNOSIS — Z11.3 SCREEN FOR STD (SEXUALLY TRANSMITTED DISEASE): Primary | ICD-10-CM

## 2024-06-13 LAB
C TRACH DNA SPEC QL NAA+PROBE: ABNORMAL
N GONORRHOEA DNA SPEC QL NAA+PROBE: ABNORMAL

## 2024-06-13 PROCEDURE — 87491 CHLMYD TRACH DNA AMP PROBE: CPT | Performed by: NURSE PRACTITIONER

## 2024-06-13 PROCEDURE — 99212 OFFICE O/P EST SF 10 MIN: CPT | Performed by: NURSE PRACTITIONER

## 2024-06-13 PROCEDURE — 87591 N.GONORRHOEAE DNA AMP PROB: CPT | Performed by: NURSE PRACTITIONER

## 2024-06-13 NOTE — TELEPHONE ENCOUNTER
I returned pt's call and explained that GC/CT culture returned as invalid.  Swab simply needs to be recollected.  Patient will come in this evening for recollection.

## 2024-06-13 NOTE — LETTER
2024    To Uzma Hernandez  : 1990      This letter is to advise you that your recent CULTURES for gonorrhea and chlamydia were reviewed by me and are NORMAL.  Please contact the office for an appointment if you have any additional concerns.    DIANA Gurrola

## 2024-06-13 NOTE — PROGRESS NOTES
Uzma Hernandez presents today to repeat GC/CT culture as her culture from 6/11/24 was invalid (likely due to presence of Metrogel in the vagina).    GC/CT culture collected now without issue.  Return as previously scheduled.

## 2024-06-15 LAB
C TRACH DNA SPEC QL NAA+PROBE: NEGATIVE
N GONORRHOEA DNA SPEC QL NAA+PROBE: NEGATIVE

## 2024-06-25 ENCOUNTER — ANNUAL EXAM (OUTPATIENT)
Dept: OBGYN CLINIC | Facility: CLINIC | Age: 34
End: 2024-06-25

## 2024-06-25 VITALS
DIASTOLIC BLOOD PRESSURE: 84 MMHG | HEART RATE: 103 BPM | HEIGHT: 63 IN | BODY MASS INDEX: 33.17 KG/M2 | SYSTOLIC BLOOD PRESSURE: 153 MMHG | WEIGHT: 187.2 LBS

## 2024-06-25 DIAGNOSIS — Z01.419 ENCOUNTER FOR GYNECOLOGICAL EXAMINATION WITHOUT ABNORMAL FINDING: Primary | ICD-10-CM

## 2024-06-25 DIAGNOSIS — N76.0 BV (BACTERIAL VAGINOSIS): ICD-10-CM

## 2024-06-25 DIAGNOSIS — Z12.39 ENCOUNTER FOR BREAST CANCER SCREENING USING NON-MAMMOGRAM MODALITY: ICD-10-CM

## 2024-06-25 DIAGNOSIS — B96.89 BV (BACTERIAL VAGINOSIS): ICD-10-CM

## 2024-06-25 DIAGNOSIS — Z12.4 SCREENING FOR CERVICAL CANCER: ICD-10-CM

## 2024-06-25 DIAGNOSIS — N89.8 VAGINAL DISCHARGE: ICD-10-CM

## 2024-06-25 PROCEDURE — 99395 PREV VISIT EST AGE 18-39: CPT | Performed by: NURSE PRACTITIONER

## 2024-06-25 PROCEDURE — 87210 SMEAR WET MOUNT SALINE/INK: CPT | Performed by: NURSE PRACTITIONER

## 2024-06-25 RX ORDER — METRONIDAZOLE 500 MG/1
500 TABLET ORAL 2 TIMES DAILY
Qty: 14 TABLET | Refills: 0 | Status: SHIPPED | OUTPATIENT
Start: 2024-06-25 | End: 2024-07-02

## 2024-06-25 NOTE — PROGRESS NOTES
ANNUAL GYNECOLOGICAL EXAMINATION    Uzma Hernandez is a 33 y.o. female who presents today for annual GYN exam.  Her last pap smear was performed 10/26/2022 and result was NILM with negative HPV.  She reports no history of abnormal pap smears in her past.  She had HIV screening performed 10/26/2022 and it was negative.  She reports menses as regular.  Patient's last menstrual period was 2024.  Her general medical history has been reviewed and she reports it as follows:    Past Medical History:   Diagnosis Date    Chlamydia     2024    Depression     Gonorrhea     Hashimoto's disease 2018    has not been treated since early , TSH normal 10/2022     Past Surgical History:   Procedure Laterality Date    NO PAST SURGERIES       OB History          2    Para   1    Term   1       0    AB   1    Living   1         SAB   0    IAB   1    Ectopic   0    Multiple   0    Live Births   1               Social History     Tobacco Use    Smoking status: Never    Smokeless tobacco: Never   Vaping Use    Vaping status: Never Used   Substance Use Topics    Alcohol use: Yes     Comment: 1-2 drinks daily, more on weekends    Drug use: Never     Social History     Substance and Sexual Activity   Sexual Activity Yes    Partners: Male    Birth control/protection: Condom Male     Cancer-related family history is negative for Breast cancer, Colon cancer, Ovarian cancer, and Cancer.    Current Outpatient Medications   Medication Instructions    Biotin 89851 MCG TABS Oral    Cyanocobalamin (B-12 PO) 5,000 mcg, Oral    Omega-3 Fatty Acids (OMEGA-3 FISH OIL PO) 2,000 mg, Oral, Daily    Prenatal MV-Min-Fe Fum-FA-DHA (PRENATAL 1 PO) Oral    Probiotic Product (CULTURELLE PROBIOTICS PO) Oral    Turmeric 500 MG CAPS Oral    zinc gluconate 50 mg, Oral, Daily       Review of Systems:  Review of Systems   Constitutional: Negative.    Gastrointestinal: Negative.    Genitourinary:  Negative for difficulty urinating,  "menstrual problem, pelvic pain and vaginal discharge.   Skin: Negative.        Physical Exam:  /84 (BP Location: Left arm, Patient Position: Sitting, Cuff Size: Standard)   Pulse 103   Ht 5' 3\" (1.6 m)   Wt 84.9 kg (187 lb 3.2 oz)   LMP 06/01/2024   BMI 33.16 kg/m²   Physical Exam  Constitutional:       General: She is not in acute distress.     Appearance: She is well-developed.   Genitourinary:      Vulva normal.      No lesions in the vagina.      Vaginal discharge (white, thin, homogenous) present.      No vaginal erythema.        Right Adnexa: not tender and no mass present.     Left Adnexa: not tender and no mass present.     No cervical motion tenderness or lesion.      Cervical exam comments: normal.      Uterus is not tender.   Breasts:     Right: No mass, nipple discharge, skin change or tenderness.      Left: No mass, nipple discharge, skin change or tenderness.   Neck:      Thyroid: No thyromegaly.   Cardiovascular:      Rate and Rhythm: Normal rate and regular rhythm.   Pulmonary:      Effort: Pulmonary effort is normal.   Abdominal:      Palpations: Abdomen is soft.      Tenderness: There is no abdominal tenderness.   Musculoskeletal:      Cervical back: Neck supple.   Neurological:      Mental Status: She is alert and oriented to person, place, and time.   Skin:     General: Skin is warm and dry.   Vitals reviewed.       Point of Care Testing:   -Wet mount: + clue cells, no trichomonads, few WBC's, pH=5.0   -KOH mount: no hyphae   -Whiff: positive    Assessment/Plan:   1. Abnormal well-woman GYN exam.  2. Cervical cancer screening:  Normal cervical exam.  Pap smear not indicated at this time.  Has not received HPV vaccine in the past.  Offered vaccine series to patient now and she declines.     3. STD screening:  Patient declines.   4. Breast cancer screening:  Normal breast exam.  Reviewed breast self-awareness.   5. Depression Screening: Patient's depression screening was assessed with a " PHQ-2 score of 2. Their PHQ-9 score was 7. Clinically patient does not have depression. No treatment is required.   6. BMI Counseling: Body mass index is 33.16 kg/m². Discussed the patient's BMI with her. The BMI is above normal. Nutrition recommendations include reducing portion sizes and decreasing overall calorie intake.   7. Contraception:  Declines other than condoms.   8. BV:  Given Rx Flagyl.   9. Return to office in 1 year for annual GYN exam.    Reviewed with patient that [facility] results are [facility] immediately, but that they will not necessarily be reviewed by me immediately.  Explained that I will review results at my earliest opportunity and contact patient appropriately.

## 2024-06-25 NOTE — PATIENT INSTRUCTIONS
Thank you for your confidence in our team.   We appreciate you and welcome your feedback.   If you receive a survey from us, please take a few moments to let us know how we are doing.   Sincerely,  DIANA Gurrola     OBESITY     Obesity is defined as a body mass index (BMI) which is greater than 30. Your Body mass index is 33.16 kg/m²..    The risks of obesity include  many health problems, such as injuries or physical disability. You may need tests to check for the following:  Diabetes     High blood pressure or high cholesterol     Heart disease     Gallbladder or liver disease     Cancer of the colon, breast, prostate, liver, or kidney     Sleep apnea     Arthritis or gout    Seek care immediately if:   You have a severe headache, confusion, or difficulty speaking.     You have weakness on one side of your body.     You have chest pain, sweating, or shortness of breath.    Contact your healthcare provider if:   You have symptoms of gallbladder or liver disease, such as pain in your upper abdomen.    You have knee or hip pain and discomfort while walking.     You have symptoms of diabetes, such as intense hunger and thirst, and frequent urination.     You have symptoms of sleep apnea, such as snoring or daytime sleepiness.     You have questions or concerns about your condition or care.    Treatment for obesity  focuses on helping you lose weight to improve your health. Even a small decrease in BMI can reduce the risk for many health problems. Your healthcare provider will help you set a weight-loss goal.  Lifestyle changes  are the first step in treating obesity. These include making healthy food choices and getting regular physical activity. Your healthcare provider may suggest a weight-loss program that involves coaching, education, and therapy.     Medicine  may help you lose weight when it is used with a healthy diet and physical activity.     Surgery  can help you lose weight if you are very obese  and have other health problems. There are several types of weight-loss surgery. Ask your healthcare provider for more information.    Be successful losing weight:   Set small, realistic goals.  An example of a small goal is to walk for 20 minutes 5 days a week. Anther goal is to lose 5% of your body weight.    Tell friends, family members, and coworkers about your goals  and ask for their support. Ask a friend to lose weight with you, or join a weight-loss support group.    Identify foods or triggers that may cause you to overeat , and find ways to avoid them. Remove tempting high-calorie foods from your home and workplace. Place a bowl of fresh fruit on your kitchen counter. If stress causes you to eat, then find other ways to cope with stress.     Keep a diary to track what you eat and drink.  Also write down how many minutes of physical activity you do each day. Weigh yourself once a week and record it in your diary.    Eating changes:  You will need to eat 500 to 1,000 fewer calories each day than you currently eat to lose 1 to 2 pounds a week. The following changes will help you cut calories:  Eat smaller portions.  Use small plates, no larger than 9 inches in diameter. Fill your plate half full of fruits and vegetables. Measure your food using measuring cups until you know what a serving size looks like.     Eat 3 meals and 1 or 2 snacks each day.  Plan your meals in advance. Cook and eat at home most of the time. Eat slowly.     Eat fruits and vegetables at every meal.  They are low in calories and high in fiber, which makes you feel full. Do not add butter, margarine, or cream sauce to vegetables. Use herbs to season steamed vegetables.     Eat less fat and fewer fried foods.  Eat more baked or grilled chicken and fish. These protein sources are lower in calories and fat than red meat. Limit fast food. Dress your salads with olive oil and vinegar instead of bottled dressing.     Limit the amount of sugar you  eat.  Do not drink sugary beverages. Limit alcohol.    Activity changes:  Physical activity is good for your body in many ways. It helps you burn calories and build strong muscles. It decreases stress and depression, and improves your mood. It can also help you sleep better. Talk to your healthcare provider before you begin an exercise program.  Exercise for at least 30 minutes 5 days a week.  Start slowly. Set aside time each day for physical activity that you enjoy and that is convenient for you. It is best to do both weight training and an activity that increases your heart rate, such as walking, bicycling, or swimming.     Find ways to be more active.  Do yard work and housecleaning. Walk up the stairs instead of using elevators. Spend your leisure time going to events that require walking, such as outdoor festivals or fairs. This extra physical activity can help you lose weight and keep it off.    Follow up with your primary healthcare provider as directed.  You may need to meet with a dietitian. Write down your questions so you remember to ask them during your visits.

## 2024-07-25 ENCOUNTER — OFFICE VISIT (OUTPATIENT)
Dept: OBGYN CLINIC | Facility: CLINIC | Age: 34
End: 2024-07-25

## 2024-07-25 VITALS
SYSTOLIC BLOOD PRESSURE: 133 MMHG | WEIGHT: 184 LBS | BODY MASS INDEX: 32.59 KG/M2 | DIASTOLIC BLOOD PRESSURE: 88 MMHG | HEART RATE: 74 BPM

## 2024-07-25 DIAGNOSIS — N76.0 RECURRENT VAGINITIS: Primary | ICD-10-CM

## 2024-07-25 DIAGNOSIS — N89.8 VAGINAL DISCHARGE: ICD-10-CM

## 2024-07-25 DIAGNOSIS — B96.89 BV (BACTERIAL VAGINOSIS): ICD-10-CM

## 2024-07-25 DIAGNOSIS — N76.0 BV (BACTERIAL VAGINOSIS): ICD-10-CM

## 2024-07-25 PROCEDURE — 99213 OFFICE O/P EST LOW 20 MIN: CPT | Performed by: NURSE PRACTITIONER

## 2024-07-25 PROCEDURE — 87210 SMEAR WET MOUNT SALINE/INK: CPT | Performed by: NURSE PRACTITIONER

## 2024-07-25 RX ORDER — METRONIDAZOLE 7.5 MG/G
1 GEL VAGINAL 2 TIMES WEEKLY
Qty: 70 G | Refills: 3 | Status: SHIPPED | OUTPATIENT
Start: 2024-07-25

## 2024-07-25 RX ORDER — METRONIDAZOLE 500 MG/1
500 TABLET ORAL 2 TIMES DAILY
Qty: 14 TABLET | Refills: 0 | Status: SHIPPED | OUTPATIENT
Start: 2024-07-25 | End: 2024-08-01

## 2024-07-25 NOTE — PROGRESS NOTES
Uzma Hernandez presents today for follow up of recurrent BV.  She was treated for BV with oral Flagyl on 2 separate occasions in 6/2024 and reports that symptoms resolved each time but that she has been starting to experience some discharge again.  Denies vaginal odor.  She has not been using vaginal Metrogel prophylaxis since 6/2023.  She endorses appropriate vaginal hygiene.    Wet mount: + clue cells, no trichomonads, few WBC's, pH=5.0, no hyphae, + whiff    Recommend 1 week oral Flagyl followed by Metrogel twice weekly prophylaxis x3 months.  Then return 1 month after discontinuing Metrogel for follow up.  Patient agrees to try.  Rx's sent to pharmacy.  Return in 4 months.

## 2024-12-05 ENCOUNTER — TELEPHONE (OUTPATIENT)
Dept: OBGYN CLINIC | Facility: CLINIC | Age: 34
End: 2024-12-05

## 2025-07-03 ENCOUNTER — TELEPHONE (OUTPATIENT)
Dept: OBGYN CLINIC | Facility: CLINIC | Age: 35
End: 2025-07-03

## 2025-08-21 ENCOUNTER — ANNUAL EXAM (OUTPATIENT)
Dept: OBGYN CLINIC | Facility: CLINIC | Age: 35
End: 2025-08-21

## 2025-08-21 ENCOUNTER — TELEPHONE (OUTPATIENT)
Dept: FAMILY MEDICINE CLINIC | Facility: CLINIC | Age: 35
End: 2025-08-21

## 2025-08-21 ENCOUNTER — OFFICE VISIT (OUTPATIENT)
Dept: FAMILY MEDICINE CLINIC | Facility: CLINIC | Age: 35
End: 2025-08-21

## 2025-08-21 VITALS
OXYGEN SATURATION: 100 % | SYSTOLIC BLOOD PRESSURE: 100 MMHG | HEIGHT: 63 IN | DIASTOLIC BLOOD PRESSURE: 70 MMHG | WEIGHT: 175.4 LBS | TEMPERATURE: 96.4 F | BODY MASS INDEX: 31.08 KG/M2 | RESPIRATION RATE: 16 BRPM | HEART RATE: 74 BPM

## 2025-08-21 VITALS — BODY MASS INDEX: 31.42 KG/M2 | SYSTOLIC BLOOD PRESSURE: 126 MMHG | WEIGHT: 177.4 LBS | DIASTOLIC BLOOD PRESSURE: 84 MMHG

## 2025-08-21 DIAGNOSIS — Z01.419 WELL WOMAN EXAM WITH ROUTINE GYNECOLOGICAL EXAM: Primary | ICD-10-CM

## 2025-08-21 DIAGNOSIS — Z00.00 ANNUAL PHYSICAL EXAM: Primary | ICD-10-CM

## 2025-08-21 DIAGNOSIS — Z02.89 ENCOUNTER FOR PHYSICAL EXAMINATION RELATED TO EMPLOYMENT: ICD-10-CM

## 2025-08-21 PROCEDURE — 99395 PREV VISIT EST AGE 18-39: CPT | Performed by: OBSTETRICS & GYNECOLOGY

## 2025-08-21 PROCEDURE — 99385 PREV VISIT NEW AGE 18-39: CPT
